# Patient Record
Sex: MALE | Race: WHITE | NOT HISPANIC OR LATINO | Employment: FULL TIME | URBAN - METROPOLITAN AREA
[De-identification: names, ages, dates, MRNs, and addresses within clinical notes are randomized per-mention and may not be internally consistent; named-entity substitution may affect disease eponyms.]

---

## 2017-10-09 ENCOUNTER — OFFICE VISIT (OUTPATIENT)
Dept: URGENT CARE | Facility: CLINIC | Age: 37
End: 2017-10-09
Payer: COMMERCIAL

## 2017-10-09 ENCOUNTER — APPOINTMENT (OUTPATIENT)
Dept: RADIOLOGY | Facility: CLINIC | Age: 37
End: 2017-10-09
Payer: COMMERCIAL

## 2017-10-09 DIAGNOSIS — S49.91XA UNSPECIFIED INJURY OF RIGHT SHOULDER AND UPPER ARM, INITIAL ENCOUNTER: ICD-10-CM

## 2017-10-09 PROCEDURE — G0383 LEV 4 HOSP TYPE B ED VISIT: HCPCS

## 2017-10-09 PROCEDURE — 73030 X-RAY EXAM OF SHOULDER: CPT

## 2017-10-10 NOTE — PROGRESS NOTES
Assessment  1  Right shoulder injury (959 2) (S49 91XA)    Plan  Right shoulder injury    · *1 - [facility]  *  Status: Active  Requested for:  [id number]  Care Summary provided  : Yes   · * XR SHOULDER 2+ VIEW RIGHT; Status:Active - Retrospective By Protocol Authorization; Requested [id number];     Discussion/Summary  Discussion Summary:   He was seen today for right shoulder pain  There is concern for rotator cuff injury  X-rays were obtained no acute fracture was noted  He will be contacted if Radiology sees any discrepancy in x-ray reading  You are referred to Orthopedics for further evaluation and treatment  To continue icing the area and using over-the-counter Advil as needed  Avoid overhead activity with this arm until seen by Orthopedics  Chief Complaint  1  Shoulder Pain  Chief Complaint Free Text Note Form: Patient relates was pulling on a strap on tree stand at home and injured right shoulder  Denies falling  Hurts to move right shoulder  History of Present Illness  HPI: Patient presents today with complaints of right shoulder pain status post injury yesterday  He states he was pulling on a strap and felt a snap in his shoulder area  He states after that he did some more work which really aggravated the shoulder pain  He is right-hand dominant he complains of pain with positional changes  He rates the pain as high as an 8/10 the pain scale  He has used Advil last night with only mild relief of the symptoms and did ice yesterday to the area  He denies any associated paresthesias  He denies any fall  He is right-hand dominant he denies any associated neck pain nausea or vomiting  Hospital Based Practices Required Assessment: Reason DV Screen not done: family at bedside    Depression And Suicide Screen  Reason suicide screen not done: family at bedside  Prefered Language is  english  Primary Language is  english        Review of Systems  Focused-Male: Constitutional: no fever or chills, feels well, no tiredness, no recent weight loss or weight gain  ENT: no complaints of earache, no loss of hearing, no nosebleeds or nasal discharge, no sore throat or hoarseness  Cardiovascular: no complaints of slow or fast heart rate, no chest pain, no palpitations, no leg claudication or lower extremity edema  Respiratory: no complaints of shortness of breath, no wheezing or cough, no dyspnea on exertion, no orthopnea or PND  Musculoskeletal: limb pain-and-right shoulder  Integumentary: no rashes  Active Problems  1  Right shoulder injury (959 2) (S49 91XA)    Past Medical History  1  History of Denial (069 29) (R44 89)  Active Problems And Past Medical History Reviewed: The active problems and past medical history were reviewed and updated today  Family History  Mother    1  No pertinent family history  Father    2  No pertinent family history  Family History Reviewed: The family history was reviewed and updated today  Social History   · Never smoker  Social History Reviewed: The social history was reviewed and updated today  Surgical History  1  Denied: History Of Prior Surgery  Surgical History Reviewed: The surgical history was reviewed and updated today  Current Meds   1  No Reported Medications Recorded    Allergies  1  No Known Drug Allergies    Vitals  Signs   Recorded: 49OMB2500 08:31AM   Temperature: 99 5 F, Tympanic  Heart Rate: 87  Respiration: 18  Systolic: 429, LUE, Sitting  Diastolic: 87, LUE, Sitting  Height: 5 ft 7 in  Weight: 221 lb 8 oz  BMI Calculated: 34 69  BSA Calculated: 2 11  O2 Saturation: 97, RA  Pain Scale: 8    Physical Exam    Constitutional   General appearance: No acute distress, well appearing and well nourished  Pulmonary   Respiratory effort: No increased work of breathing or signs of respiratory distress  Auscultation of lungs: Clear to auscultation      Cardiovascular   Palpation of heart: Normal PMI, no thrills  Auscultation of heart: Normal rate and rhythm, normal S1 and S2, without murmurs  Examination of extremities for edema and/or varicosities: Normal     Musculoskeletal   Inspection/palpation of joints, bones, and muscles: Abnormal  -Decreased range of motion of right shoulder  Weakness with supraspinatus testing  Decreased external and internal rotation  Positive impingement sign  Good  strength noted  Biceps and triceps testing normal  No tenderness over the [facility] joint clavicle or sternoclavicular joint  Positive radial pulse  Results/Data  Diagnostic Studies Reviewed: I personally reviewed the films/images/results in the office today  My interpretation follows  X-ray Review No acute fracture        Signatures   Electronically signed by : Cee Perez DO; Oct  9 2017  9:06AM EST                       (Author)

## 2017-11-16 ENCOUNTER — OFFICE VISIT (OUTPATIENT)
Dept: URGENT CARE | Facility: CLINIC | Age: 37
End: 2017-11-16
Payer: COMMERCIAL

## 2017-11-16 PROCEDURE — G0382 LEV 3 HOSP TYPE B ED VISIT: HCPCS

## 2017-11-17 NOTE — PROGRESS NOTES
Assessment    1  Acute bacterial bronchitis (466 0,041 9) (J20 8,B96 89)    Plan  Acute bacterial bronchitis    · Azithromycin 250 MG Oral Tablet; Take 2 tablets today, then 1 tablet daily for 4 days   · Avoid exposure to cigarette smoke ; Status:Complete;   Done: 40QFW8599   · Drink plenty of fluids ; Status:Complete;   Done: 67TBI2872   · Use a cool mist humidifier in the room ; Status:Complete;   Done: 94DBZ0895   · Use a cough medicine to help you get adequate rest ; Status:Complete;   Done:[id number]   · You may slowly resume your normal level of activity once you feel better  ;Status:Complete;   Done: 30BFR1342    Discussion/Summary  Discussion Summary:   Mucinex to keep the mucus thin  Medication Side Effects Reviewed: Possible side effects of new medications were reviewed with the patient/guardian today  Understands and agrees with treatment plan: The treatment plan was reviewed with the patient/guardian  The patient/guardian understands and agrees with the treatment plan   Follow Up Instructions: Follow Up with your Primary Care Provider in 4-5 days  If your symptoms worsen, go to the [place] [address] Emergency Department  Chief Complaint    1  Cold Symptoms   2  Cough  Chief Complaint Free Text Note Form: Cough and congestion started 4 days ago      History of Present Illness  Hospital Based Practices Required Assessment:  Pain Assessment  the patient states they do not have pain  Abuse And Domestic Violence Screen   Yes, the patient is safe at home  -- The patient states no one is hurting them  Depression And Suicide Screen  No, the patient has not had thoughts of hurting themself  No, the patient has not felt depressed in the past 7 days  Cold Symptoms:   Dwain Elliott presents with complaints of gradual onset of constant episodes of moderate cold symptoms  Episodes started about 4 days ago  Symptoms are worsening    Associated symptoms include nasal congestion,-- runny nose,-- post nasal drainage,-- productive cough-- and-- chills, but-- no facial pressure,-- no facial pain-- and-- no fever  Review of Systems  Focused-Male:  Constitutional: as noted in HPI   ENT: as noted in HPI  Cardiovascular: no complaints of slow or fast heart rate, no chest pain, no palpitations, no leg claudication or lower extremity edema  Respiratory: as noted in HPI  Gastrointestinal: no complaints of abdominal pain, no constipation, no nausea or vomiting, no diarrhea or bloody stools  Active Problems  1  Right shoulder injury (959 2) (S49 91XA)    Past Medical History  1  History of Denial (799 29) (R41 89)   2  No pertinent past medical history  Active Problems And Past Medical History Reviewed: The active problems and past medical history were reviewed and updated today  Family History  Mother    1  No pertinent family history  Father    2  No pertinent family history  Family History Reviewed: The family history was reviewed and updated today  Social History   · Never a smoker   · Never smoker  Social History Reviewed: The social history was reviewed and updated today  Surgical History  1  Denied: History Of Prior Surgery  Surgical History Reviewed: The surgical history was reviewed and updated today  Current Meds   1  No Reported Medications Recorded  Medication List Reviewed: The medication list was reviewed and updated today  Allergies    1  No Known Drug Allergies    Vitals  Signs   Recorded: 17PBL8909 12:13PM   Temperature: 99 F  Heart Rate: 65  Respiration: 20  Systolic: 503  Diastolic: 88  Height: 5 ft 7 in  Weight: 220 lb   BMI Calculated: 34 46  BSA Calculated: 2 11  O2 Saturation: 96    Physical Exam   Constitutional  General appearance: No acute distress, well appearing and well nourished  Ears, Nose, Mouth, and Throat  External inspection of ears and nose: Normal    Otoscopic examination: Tympanic membrance translucent with normal light reflex   Canals patent without erythema  Nasal mucosa, septum, and turbinates: Normal without edema or erythema  Oropharynx: Normal with no erythema, edema, exudate or lesions  Pulmonary  Respiratory effort: No increased work of breathing or signs of respiratory distress  Auscultation of lungs: Abnormal   no wheezing  diffuse bronchial breath sounds bilaterally  Cardiovascular  Auscultation of heart: Normal rate and rhythm, normal S1 and S2, without murmurs         Signatures   Electronically signed by : PRADIP Berry ; Nov 16 2017 12:31PM EST                       (Author)